# Patient Record
Sex: FEMALE | Employment: UNEMPLOYED
[De-identification: names, ages, dates, MRNs, and addresses within clinical notes are randomized per-mention and may not be internally consistent; named-entity substitution may affect disease eponyms.]

---

## 2020-07-23 ENCOUNTER — TRANSCRIBE ORDERS (OUTPATIENT)
Dept: OTHER | Age: 25
End: 2020-07-23

## 2020-07-23 DIAGNOSIS — H90.6 MIXED HEARING LOSS, BILATERAL: ICD-10-CM

## 2020-07-23 DIAGNOSIS — H66.13 CHRONIC TUBOTYMPANIC SUPPURATIVE OTITIS MEDIA OF BOTH EARS: Primary | ICD-10-CM

## 2021-04-27 ENCOUNTER — TRANSFERRED RECORDS (OUTPATIENT)
Dept: HEALTH INFORMATION MANAGEMENT | Facility: CLINIC | Age: 26
End: 2021-04-27

## 2021-06-08 ENCOUNTER — TRANSFERRED RECORDS (OUTPATIENT)
Dept: HEALTH INFORMATION MANAGEMENT | Facility: CLINIC | Age: 26
End: 2021-06-08

## 2021-08-16 ENCOUNTER — TRANSFERRED RECORDS (OUTPATIENT)
Dept: HEALTH INFORMATION MANAGEMENT | Facility: CLINIC | Age: 26
End: 2021-08-16

## 2021-08-16 ENCOUNTER — MEDICAL CORRESPONDENCE (OUTPATIENT)
Dept: HEALTH INFORMATION MANAGEMENT | Facility: CLINIC | Age: 26
End: 2021-08-16

## 2021-08-17 ENCOUNTER — TRANSCRIBE ORDERS (OUTPATIENT)
Dept: OTHER | Age: 26
End: 2021-08-17

## 2021-08-17 DIAGNOSIS — H71.92 CHOLESTEATOMA, LEFT: Primary | ICD-10-CM

## 2021-08-18 NOTE — TELEPHONE ENCOUNTER
FUTURE VISIT INFORMATION       FUTURE VISIT INFORMATION:    Date:   2021    Time:   9:10AM    [place] [facility] [place]  REFERRAL INFORMATION:    Referring provider:  Dr Kamilla Sargent    Referring providers clinic:  [facility]     Reason for visit/diagnosis  Win prior - Cholesteatoma, left , unsure of recs, Referral from [facility], Kamilla Sargent, appt made by sister/britt (finish registration at visit)    RECORDS REQUESTED FROM:       Clinic name Comments Records Status Imaging Status   [facility]   Phone: [phone]     2021, 2021, 2021, 2021 note from Dr Sargent     Sent to scan 21    [facility]   2021 CT POSTERIOR FOSSA EAR  Care everywhere  req 21 - PACS   [address] [place] [address]  [facility] ph. [phone]  Email: [email or web address]   Fax: [phone]  Imaging trackin     Images in PACS:   2016 CT Inner Ear     recs received 10/22/21  2015 audio eval with Anisa Jacobo AuD (no audiogram)   2015 ENT note from Dr. Nataly Woodruff   2016 Tymp with Denisa Law AuD  2016, 2016 ENT note from Dr. Fei Torres  2016, 2016, 2016 note from Dr Gabi Porter   2016 Audio eval with Nilesh Hernandez AuD (no audiogram)   2016 op report        req 21, req 10/5/21, req 10/12/21  req 10/18/21 - received 10/22/21 PACS                 2021 11:53AM sent a fax to [facility] ent for audios and imaging reports - Amay   2021 10:27AM received imaging report, no audios, sent a 2nd fax for audio (2021 audio). Sent a fax to [facility] for images - Amay   2021 9:14AM images from [facility] in PACS, called [facility] connected with medical records, they do not have an audio in their system. Notified med recs that the 2012 note from Dr Sargent indicated she had an audio done in clinic that day. Gave med recs my fax number, asked that they fax  audio when they can retrieve it. Will follow up in a week, sent a 3rd fax. SEnt a fax to [facility]os[facility] for ear cultures  - Amay   9/28/2021 11AM sent a req to [facility] for recs, and [facility] ent for more recs - amay   10/5/2021 12:11PM sent a 2nd fax to [facility] for recs and images - Amay   10/12/2021 11:37AM called [facility] to follow up on request, they did not receive the last 2 requests, re-faxed request for recs with signed DELMY - Amay   10/18/2021 9:35AM sent a 3rd fax for recs - Amay   10/22/2021 2PM connected with katy at [facility], records re-sent and received. Sent to scan. Sent a fax for Audiograms - Amay   11/2/2021 10:38AM called [facility], was transferred back to katy, left katy a voicemail to see if she can send the audiograms - Amay   *3:48PM received a fax from [facility], stating patient needs a signed DELMY. Called [facility] back, asked to be connected to katy (ph. [phone]). Notified Katy that we sent a signed DELMY back in September, that was how we got her records in the first place. Asked that she check if audiograms are available to send, and to send them. Left my direct for call back and re-faxed request with signed DELMY - Amay   11/15/2021 8:46AM message sent to nurse and MD about audios, closing encounter - Amay

## 2021-09-09 NOTE — TELEPHONE ENCOUNTER
Called patient with a mary , patient had 2016 surgery with a Dr Porter, indicated in the [facility] notes. Connected with patient, she had surgery in [place], [place] and will hand carry her records to the appointment. Patient thinks it was done with [facility]. Asked patient if she was ok with signing an DELMY. Patient is ok with DELMY mailed to her address with instructions on how to email it back to me. Verfiied patient's email and updated chart [email or web address]. Sent DELMY via fed-ex     *Trackin      Records 21 received 10/22/21   Facility  [address] [place] [address]  [facility] ph. [phone]  Email: [email or web address]   Fax: [phone]    Outcome 2016 surgery from Dr Porter? Patient thinks it was done at [facility]. Mailed out DELMY to patient for [facility] recs     10/19/2021 10:38AM received imaging disc from [facility] (2016 CT inner ear). Disc will be sent to process, secure email from Gigi hO received 10/18 (no recs with secure email). Called today and left a message with gigi to call me back or send me the recs - Amay   10/21/2021 11:19AM called Gigi and left her another voicemail, to check status on records. Sent a fax for records attn Gigi - Amay   10/22/2021 1:40PM finally connected with gigi (ph. [phone]) she will resend the recs via secure email. Notified gigi that I will call her back Monday if I don't see the recs - Amay

## 2021-09-17 ENCOUNTER — APPOINTMENT (OUTPATIENT)
Dept: INTERPRETER SERVICES | Facility: CLINIC | Age: 26
End: 2021-09-17
Payer: COMMERCIAL

## 2021-09-17 NOTE — TELEPHONE ENCOUNTER
Left a message with a [place] . Tried to connect with patient at home number, but call was dropped. Attempted to reach patient on cell phone, was able to leave a message asking if patient received DELMY mailed to her address on 9/9. I tracked DELMY, and it was delivered on 9/10. Needing to know if patient is able to sign the DELMY, per our conversation on 9/9, and get it back to me via email. Instructions were sent to patient with DELMY to email me signed DELMY to my email address: [email or web address]    Left clinic number for call back so that patient can connect with an .     Amay     Update: 9/28/21 11AM: received signed DELMY via email, sent a request for recs - Amay

## 2021-10-12 ENCOUNTER — PRE VISIT (OUTPATIENT)
Dept: OTOLARYNGOLOGY | Facility: CLINIC | Age: 26
End: 2021-10-12

## 2021-10-18 ENCOUNTER — TRANSFERRED RECORDS (OUTPATIENT)
Dept: HEALTH INFORMATION MANAGEMENT | Facility: CLINIC | Age: 26
End: 2021-10-18

## 2021-11-09 DIAGNOSIS — Z01.10 ENCOUNTER FOR HEARING TEST: Primary | ICD-10-CM

## 2021-11-16 NOTE — PROGRESS NOTES
Neurotology Clinic      Name: Tao Kraft  MRN: [id number]  Age: 26 year old  : 1995  Referring provider: Kamilla Sargent  2021      Chief Complaint:   Consultation    History of Present Illness:   Tao Kraft is a 26 year old female who presents for consultation. Consultation was requested by Kamilla Sargent MD.    The patient has been following with Dr. Sargent at [facility] since August of this year. She was recently seen on 2021 by Dr. 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 The patient had reported that she was still experiencing some left ear drainage, but was reluctant on surgery.     Per Dr. Sargent's note, the patient has a cholesteatoma that has fistualized to the skin with a 5mm opening on the left. A culture was sent for the left sided purulent drainage. This was removed and there was a near to total TM perforation. She also had antibiotic powder placed in the right ear. She did have a previous surgery in 2016 that was reported to be quite challenging. Therefore, a referral was placed to me.     Today, the patient reports that the hearing in her left ear has declined and she has noticed some otorrhea. The patient and her sister explained that after her first surgery, the patient's hearing in her left ear dramatically decreased. She is here today to seek a second opinion to improve her hearing. She would like other options other than surgery including 3 month ear cleanings.     She denies any dizziness or tinnitus. She does have bilateral hearing aids. The patient explained that the right hearing aid seems to work better. 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 The patient has no vision issues with the right eye. The patient has had no other surgeries besides the surgery in [place] in 2016. Finally, she explained that in the fistula behind her left ear, she is able to hear if it is not covered.      Review  of Systems:   Pertinent items are noted in HPI or as in patient entered ROS below, remainder of complete ROS is negative.   No flowsheet data found.     Active Medications:   No current outpatient medications on file.      Allergies:   Patient has no known allergies.      Past Medical History:  No past medical history on file.  There is no problem list on file for this patient.    Past Surgical History:  No past surgical history on file.    Family History:   No family history on file.      Social History:   Social History     Tobacco Use     Smoking status: Never Smoker     Smokeless tobacco: Never Used   Substance Use Topics     Alcohol use: Never     Drug use: None     Physical Exam:   Pulse 50   Temp (!) 96.3  F (35.7  C)   Wt 57.6 kg (127 lb)   SpO2 100%      Constitutional:  The patient was accompanied by her sister, well-groomed, and in no acute distress.     Skin: Normal:  warm and pink without rash   Neurologic: Alert and oriented x 3.  CN's III-XII within normal limits.  Voice normal.    Psychiatric: The patient's affect was calm, cooperative, and appropriate.     Communication:  Normal; communicates verbally, normal voice quality.   Respiratory: Breathing comfortably without stridor or exertion of accessory muscles.    Eyes: Pupils were equal and reactive.  Extraocular movement intact.     Ears: Pinnae and tragus non-tender.       Otologic microscope exam:  Right ear: Chronic appearing subtotal TM perforation. Malleolus medially rotated. Incus and stapes visible.   Left ear: Near total TM perforation. The malleous points straight inward. Behind the ear there is a masto cutaneous fistula and through it I can see cholesteatoma. I suctioned through the fistula to clear out the cholesteatoma. No. 7 suction was placed through the opening of the fistula to complete this.     Audiogram:  AUDIOGRAM: She underwent an audiogram today. This demonstrated:      Right: Speech reception threshold is 60 dB with 96%  word recognition at 90 dB. Tympanogram B type   Left: Speech reception threshold is 55 dB with 96% word recognition at 90 dB. Tympanogram As type     Audiogram was independently reviewed    Imaging:  CT Posterior Fossa Ear W/O (04/27/2021)  IMPRESSION:   1. Slight thickening of the soft tissues along the right external auditory   canal without adjacent osseous erosion may be related to mild otitis externa or   inflammation.   2. Opacification of majority of the right mastoid air cells and portion of the   mastoid antrum, without evidence for coalescent mastoiditis.   3. Small amount of soft tissue density within the upper aspect of the medial   left external auditory canal may represent a small amount of cerumen, but no   adjacent ossicular erosion.  There is a defect along the outer cortex of the   left mastoid with soft tissue density within the upper-outer left mastoid   adjacent to the defect, which may be postoperative.  If there is no history of   surgery of the left mastoid, this can be seen with inflammatory/infectious   process or underlying mass.   4. Opacification of much of the bilateral middle ear cavities without qasim   ossicular erosion or blunting of the scutum, which may be seen with   inflammatory/infectious debris, granulation tissue, or cholesteatomas.    CT Inner Efar W/O Contrast (02/16/2016)      Outside records from [facility] and [facility] were independently reviewed.     Assessment and Plan:  Tao Kraft is a 26 year old female with a history of TM perforation and prior surgeries. 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    I preformed a cleaning of the fistula and cholesteatoma.  I explained to her that my overall recommendation is that she proceed with surgery to ensure safety in the long term. I discussed what the surgery would entail with mastoidectomy, likely with canal wall down given the apparent etiology of the current disease. This  can be done safely and needs to be done given her young age.  We reviewed the potential complications of untreated disease including facial paralysis, sensorineural hearing loss, vertigo and vestibular loss, brain abscess, and others. We also needed to review the risks of surgery. The patient and her sister state that they will discuss options and call back with decisions.     We also discussed the possibilities of a bone-anchored hearing device and how this could help improve overall quality of life for her after cholesteatoma surgery.ou fo    Thank you for referring her to our care.    Alize Sharma MD  Otology & Neurotology  [facility]      Scribe Disclosure:  I, Linda Salazar, am serving as a scribe to document services personally performed by Alize Sharma MD at this visit, based upon the provider's statements to me. All documentation has been reviewed by the aforementioned provider prior to being entered into the official medical record.    The documentation recorded by the scribe accurately reflects the services I personally performed and the decisions made by me.    Alize Sharma MD  Otology & Neurotology  [facility]

## 2021-11-19 ENCOUNTER — OFFICE VISIT (OUTPATIENT)
Dept: OTOLARYNGOLOGY | Facility: CLINIC | Age: 26
End: 2021-11-19
Payer: COMMERCIAL

## 2021-11-19 ENCOUNTER — OFFICE VISIT (OUTPATIENT)
Dept: AUDIOLOGY | Facility: CLINIC | Age: 26
End: 2021-11-19
Payer: COMMERCIAL

## 2021-11-19 VITALS — OXYGEN SATURATION: 100 % | HEART RATE: 50 BPM | WEIGHT: 127 LBS | TEMPERATURE: 96.3 F

## 2021-11-19 DIAGNOSIS — H90.72 MIXED HEARING LOSS OF LEFT EAR: Primary | ICD-10-CM

## 2021-11-19 DIAGNOSIS — H90.11 CONDUCTIVE HEARING LOSS IN RIGHT EAR: ICD-10-CM

## 2021-11-19 DIAGNOSIS — H71.92 CHOLESTEATOMA, LEFT: Primary | ICD-10-CM

## 2021-11-19 DIAGNOSIS — H90.12 CONDUCTIVE HEARING LOSS OF LEFT EAR WITH UNRESTRICTED HEARING OF RIGHT EAR: ICD-10-CM

## 2021-11-19 DIAGNOSIS — Z01.10 ENCOUNTER FOR HEARING TEST: ICD-10-CM

## 2021-11-19 DIAGNOSIS — H70.812: ICD-10-CM

## 2021-11-19 PROCEDURE — 99204 OFFICE O/P NEW MOD 45 MIN: CPT | Mod: 25 | Performed by: OTOLARYNGOLOGY

## 2021-11-19 PROCEDURE — 92504 EAR MICROSCOPY EXAMINATION: CPT | Performed by: OTOLARYNGOLOGY

## 2021-11-19 PROCEDURE — 92567 TYMPANOMETRY: CPT | Performed by: AUDIOLOGIST

## 2021-11-19 PROCEDURE — 92557 COMPREHENSIVE HEARING TEST: CPT | Performed by: AUDIOLOGIST

## 2021-11-19 PROCEDURE — 92565 STENGER TEST PURE TONE: CPT | Performed by: AUDIOLOGIST

## 2021-11-19 ASSESSMENT — PAIN SCALES - GENERAL: PAINLEVEL: NO PAIN (0)

## 2021-11-19 NOTE — LETTER
2021       RE: Tao Kraft  [address]  [place] [address]     Dear Colleague,    Thank you for referring your patient, Tao Kraft, to the [facility] [place] at [facility]. Please see a copy of my visit note below.      Neurotology Clinic      Name: Tao Kraft  MRN: [id number]  Age: 26 year old  : 1995  Referring provider: Kamilla Sargent  2021      Chief Complaint:   Consultation    History of Present Illness:   Tao Kraft is a 26 year old female who presents for consultation. Consultation was requested by Kamilla Sargent MD.    The patient has been following with Dr. Sargent at [facility] since August of this year. She was recently seen on 2021 by Dr. 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 The patient had reported that she was still experiencing some left ear drainage, but was reluctant on surgery.     Per Dr. Sargent's note, the patient has a cholesteatoma that has fistualized to the skin with a 5mm opening on the left. A culture was sent for the left sided purulent drainage. This was removed and there was a near to total TM perforation. She also had antibiotic powder placed in the right ear. She did have a previous surgery in 2016 that was reported to be quite challenging. Therefore, a referral was placed to me.     Today, the patient reports that the hearing in her left ear has declined and she has noticed some otorrhea. The patient and her sister explained that after her first surgery, the patient's hearing in her left ear dramatically decreased. She is here today to seek a second opinion to improve her hearing. She would like other options other than surgery including 3 month ear cleanings.     She denies any dizziness or tinnitus. She does have bilateral hearing aids. The patient explained that the right hearing aid seems to work better. She can see out of her  right eye with vision loss in the left eye. 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 The patient has no vision issues with the right eye. The patient has had no other surgeries besides the surgery in [place] in 2016. Finally, she explained that in the fistula behind her left ear, she is able to hear if it is not covered.      Review of Systems:   Pertinent items are noted in HPI or as in patient entered ROS below, remainder of complete ROS is negative.   No flowsheet data found.     Active Medications:   No current outpatient medications on file.      Allergies:   Patient has no known allergies.      Past Medical History:  No past medical history on file.  There is no problem list on file for this patient.    Past Surgical History:  No past surgical history on file.    Family History:   No family history on file.      Social History:   Social History     Tobacco Use     Smoking status: Never Smoker     Smokeless tobacco: Never Used   Substance Use Topics     Alcohol use: Never     Drug use: None     Physical Exam:   Pulse 50   Temp (!) 96.3  F (35.7  C)   Wt 57.6 kg (127 lb)   SpO2 100%      Constitutional:  The patient was accompanied by her sister, well-groomed, and in no acute distress.     Skin: Normal:  warm and pink without rash   Neurologic: Alert and oriented x 3.  CN's III-XII within normal limits.  Voice normal.    Psychiatric: The patient's affect was calm, cooperative, and appropriate.     Communication:  Normal; communicates verbally, normal voice quality.   Respiratory: Breathing comfortably without stridor or exertion of accessory muscles.    Eyes: Pupils were equal and reactive.  Extraocular movement intact.     Ears: Pinnae and tragus non-tender.       Otologic microscope exam:  Right ear: Chronic appearing subtotal TM perforation. Malleolus medially rotated. Incus and stapes visible.   Left ear: Near total TM perforation. The malleous points straight inward. Behind the ear there is a masto cutaneous  fistula and through it I can see cholesteatoma. I suctioned through the fistula to clear out the cholesteatoma. No. 7 suction was placed through the opening of the fistula to complete this.     Audiogram:  AUDIOGRAM: She underwent an audiogram today. This demonstrated:      Right: Speech reception threshold is 60 dB with 96% word recognition at 90 dB. Tympanogram B type   Left: Speech reception threshold is 55 dB with 96% word recognition at 90 dB. Tympanogram As type     Audiogram was independently reviewed    Imaging:  CT Posterior Fossa Ear W/O (04/27/2021)  IMPRESSION:   1. Slight thickening of the soft tissues along the right external auditory   canal without adjacent osseous erosion may be related to mild otitis externa or   inflammation.   2. Opacification of majority of the right mastoid air cells and portion of the   mastoid antrum, without evidence for coalescent mastoiditis.   3. Small amount of soft tissue density within the upper aspect of the medial   left external auditory canal may represent a small amount of cerumen, but no   adjacent ossicular erosion.  There is a defect along the outer cortex of the   left mastoid with soft tissue density within the upper-outer left mastoid   adjacent to the defect, which may be postoperative.  If there is no history of   surgery of the left mastoid, this can be seen with inflammatory/infectious   process or underlying mass.   4. Opacification of much of the bilateral middle ear cavities without qasim   ossicular erosion or blunting of the scutum, which may be seen with   inflammatory/infectious debris, granulation tissue, or cholesteatomas.    CT Inner Efar W/O Contrast (02/16/2016)      Outside records from [facility] and [facility] were independently reviewed.     Assessment and Plan:  Tao Kraft is a 26 year old female with a history of TM perforation and prior surgeries. She has a large left mastoid cholesteatoma with a fistula to the  skin.    I preformed a cleaning of the fistula and cholesteatoma.  I explained to her that my overall recommendation is that she proceed with surgery to ensure safety in the long term. I discussed what the surgery would entail with mastoidectomy, likely with canal wall down given the apparent etiology of the current disease. This can be done safely and needs to be done given her young age.  We reviewed the potential complications of untreated disease including facial paralysis, sensorineural hearing loss, vertigo and vestibular loss, brain abscess, and others. We also needed to review the risks of surgery. The patient and her sister state that they will discuss options and call back with decisions.     We also discussed the possibilities of a bone-anchored hearing device and how this could help improve overall quality of life for her after cholesteatoma surgery.ou fo    Thank you for referring her to our care.    Alize Sharma MD  Otology & Neurotology  [facility]      Scribe Disclosure:  I, Linda Salazar, am serving as a scribe to document services personally performed by Alize Sharma MD at this visit, based upon the provider's statements to me. All documentation has been reviewed by the aforementioned provider prior to being entered into the official medical record.    The documentation recorded by the scribe accurately reflects the services I personally performed and the decisions made by me.    Alize Sharma MD  Otology & Neurotology  [facility]

## 2021-11-19 NOTE — PROGRESS NOTES
AUDIOLOGY REPORT    SUMMARY: Audiology visit completed. See audiogram for results.      RECOMMENDATIONS: Follow-up with ENT.    Prosper Kelly. CCC-A  Licensed Audiologist   [place] #[id number]

## 2021-11-19 NOTE — PATIENT INSTRUCTIONS
1. You were seen in the ENT Clinic today by Dr. Sharma.  If you have any questions or concerns after your appointment, please call   - Option 1: ENT Clinic: [phone]   - Option 2: [facility] (Dr. Sharma' Nurse): [phone]                    [place](Dr. Sharma' Nurse): [phone]      2.   Plan to return to clinic- to be determined- call with decision    3. You will need hearing amplified stethoscope    Renay Monk LPN  ealth - Otolaryngology

## 2022-07-17 ENCOUNTER — HEALTH MAINTENANCE LETTER (OUTPATIENT)
Age: 27
End: 2022-07-17

## 2022-09-24 ENCOUNTER — HEALTH MAINTENANCE LETTER (OUTPATIENT)
Age: 27
End: 2022-09-24

## 2023-08-05 ENCOUNTER — HEALTH MAINTENANCE LETTER (OUTPATIENT)
Age: 28
End: 2023-08-05

## 2024-09-22 ENCOUNTER — HEALTH MAINTENANCE LETTER (OUTPATIENT)
Age: 29
End: 2024-09-22